# Patient Record
Sex: MALE | Race: WHITE | Employment: FULL TIME
[De-identification: names, ages, dates, MRNs, and addresses within clinical notes are randomized per-mention and may not be internally consistent; named-entity substitution may affect disease eponyms.]

---

## 2019-11-05 ENCOUNTER — HEALTH MAINTENANCE LETTER (OUTPATIENT)
Age: 34
End: 2019-11-05

## 2019-11-27 ENCOUNTER — OFFICE VISIT (OUTPATIENT)
Dept: SLEEP MEDICINE | Facility: CLINIC | Age: 34
End: 2019-11-27
Payer: COMMERCIAL

## 2019-11-27 VITALS
RESPIRATION RATE: 16 BRPM | SYSTOLIC BLOOD PRESSURE: 123 MMHG | OXYGEN SATURATION: 99 % | WEIGHT: 173 LBS | BODY MASS INDEX: 22.93 KG/M2 | HEART RATE: 73 BPM | DIASTOLIC BLOOD PRESSURE: 77 MMHG | HEIGHT: 73 IN

## 2019-11-27 DIAGNOSIS — R53.82 CHRONIC FATIGUE: ICD-10-CM

## 2019-11-27 DIAGNOSIS — G47.9 SLEEP DISTURBANCE: ICD-10-CM

## 2019-11-27 DIAGNOSIS — R06.81 WITNESSED APNEIC SPELLS: Primary | ICD-10-CM

## 2019-11-27 PROCEDURE — 99204 OFFICE O/P NEW MOD 45 MIN: CPT | Performed by: INTERNAL MEDICINE

## 2019-11-27 ASSESSMENT — MIFFLIN-ST. JEOR: SCORE: 1788.6

## 2019-11-27 NOTE — PATIENT INSTRUCTIONS
Your BMI is Body mass index is 22.82 kg/m .  Weight management is a personal decision.  If you are interested in exploring weight loss strategies, the following discussion covers the approaches that may be successful. Body mass index (BMI) is one way to tell whether you are at a healthy weight, overweight, or obese. It measures your weight in relation to your height.  A BMI of 18.5 to 24.9 is in the healthy range. A person with a BMI of 25 to 29.9 is considered overweight, and someone with a BMI of 30 or greater is considered obese. More than two-thirds of American adults are considered overweight or obese.  Being overweight or obese increases the risk for further weight gain. Excess weight may lead to heart disease and diabetes.  Creating and following plans for healthy eating and physical activity may help you improve your health.  Weight control is part of healthy lifestyle and includes exercise, emotional health, and healthy eating habits. Careful eating habits lifelong are the mainstay of weight control. Though there are significant health benefits from weight loss, long-term weight loss with diet alone may be very difficult to achieve- studies show long-term success with dietary management in less than 10% of people. Attaining a healthy weight may be especially difficult to achieve in those with severe obesity. In some cases, medications, devices and surgical management might be considered.  What can you do?  If you are overweight or obese and are interested in methods for weight loss, you should discuss this with your provider.     Consider reducing daily calorie intake by 500 calories.     Keep a food journal.     Avoiding skipping meals, consider cutting portions instead.    Diet combined with exercise helps maintain muscle while optimizing fat loss. Strength training is particularly important for building and maintaining muscle mass. Exercise helps reduce stress, increase energy, and improves fitness.  Increasing exercise without diet control, however, may not burn enough calories to loose weight.       Start walking three days a week 10-20 minutes at a time    Work towards walking thirty minutes five days a week     Eventually, increase the speed of your walking for 1-2 minutes at time    In addition, we recommend that you review healthy lifestyles and methods for weight loss available through the National Institutes of Health patient information sites:  http://win.niddk.nih.gov/publications/index.htm    And look into health and wellness programs that may be available through your health insurance provider, employer, local community center, or yosvany club.

## 2019-11-27 NOTE — PROGRESS NOTES
Sleep Consultation:    Date on this visit: 11/27/2019    Guanakito Salazar is sent by No ref. provider found for a sleep consultation regarding tiredness and possible sleep apnea.    Primary Physician: Jenae Karimi     Chief compliant: witnessed apneas, fatigue, headaches     Presenting History:     Guanakito Salazar reports frequent witnessed apneas and daytime fatigue for last 5 years.     Patient reports that his sleep is non refreshing and he feels tired through out the day. He denies inadvertent napping or significant daytime sleepiness.       Patient has woken up gasping and his wife has witnessed apneas in his sleep. Guanakito doesnot thinks that he snores . Patient does have a regular bed partner. There is report of gasping and poor quality of sleep.  He does have witnessed apneas. They never sleep separately.  Patient sleeps on his back, side and stomach. He has occasional morning headaches and frequent morning dry mouth, denies no restless legs. Guanakito denies any bruxism, sleep walking, sleep talking, dream enactment, sleep paralysis, cataplexy and hypnogogic/hypnopompic hallucinations.    Patient describes themself as a night person.  He would prefer to go to sleep at 1:00 AM and wake up at 10:00 AM.    Guanakito goes to sleep at 11:00 PM during the week. He wakes up at 7:00 AM with an alarm. He falls asleep in 5 minutes.  Guanakito denies difficulty falling asleep.  He wakes up 3 times a night for 10 minutes before falling back to sleep.  Guanakito wakes up to external stimuli.  On weekends, Guanakito goes to sleep at 11:00 PM.  He wakes up at 9:00 AM without an alarm. He falls asleep in 5 minutes.  Patient gets an average of 8 hours of sleep per night.     Guanakito denies difficulty breathing through his nose.      Patient's [place] Sleepiness score 9/24 consistent with no daytime sleepiness.      Guanakito naps 2 times per week for 30-60 minutes, feels refreshed after naps. He takes no inadvertant naps.  He denies closing eyes, dozing and falling asleep  while driving.   Patient was counseled on the importance of driving while alert, to pull over if drowsy, or nap before getting into the vehicle if sleepy.      He uses 2 cups/day of coffee. Last caffeine intake is usually before 2 pm.    Allergies:    No Known Allergies    Medications:    No current outpatient medications on file.       Problem List:  Patient Active Problem List    Diagnosis Date Noted     CARDIOVASCULAR SCREENING; LDL GOAL LESS THAN 160 10/31/2010     Priority: Medium     Chronic rhinitis 05/28/2010     Priority: Medium     Seasonal allergies      Priority: Medium        Past Medical/Surgical History:  Past Medical History:   Diagnosis Date     Seasonal allergies      Past Surgical History:   Procedure Laterality Date     NO HISTORY OF SURGERY         Social History:  Social History     Socioeconomic History     Marital status: Single     Spouse name: Not on file     Number of children: Not on file     Years of education: Not on file     Highest education level: Not on file   Occupational History     Occupation:      Employer: [employer]   Social Needs     Financial resource strain: Not on file     Food insecurity:     Worry: Not on file     Inability: Not on file     Transportation needs:     Medical: Not on file     Non-medical: Not on file   Tobacco Use     Smoking status: Never Smoker     Smokeless tobacco: Never Used   Substance and Sexual Activity     Alcohol use: Yes     Drug use: No     Sexual activity: Yes     Partners: Female   Lifestyle     Physical activity:     Days per week: Not on file     Minutes per session: Not on file     Stress: Not on file   Relationships     Social connections:     Talks on phone: Not on file     Gets together: Not on file     Attends Spiritism service: Not on file     Active member of club or organization: Not on file     Attends meetings of clubs or organizations: Not on file     Relationship status: Not on file     Intimate partner violence:     " Fear of current or ex partner: Not on file     Emotionally abused: Not on file     Physically abused: Not on file     Forced sexual activity: Not on file   Other Topics Concern     Parent/sibling w/ CABG, MI or angioplasty before 65F 55M? Not Asked   Social History Narrative     Not on file       Family History:  Family History   Problem Relation Age of Onset     Allergies Mother      Diabetes Father      - Father snores loudly.     Review of Systems:  A complete review of systems reviewed by me is negative with the exeption of what has been mentioned in the history of present illness.  CONSTITUTIONAL: NEGATIVE for weight gain/loss, fever, chills, sweats or night sweats, drug allergies.  EYES: NEGATIVE for changes in vision, blind spots, double vision.  ENT: NEGATIVE for ear pain, sore throat, sinus pain, post-nasal drip, runny nose, bloody nose  CARDIAC: NEGATIVE for fast heartbeats or fluttering in chest, chest pain or pressure, breathlessness when lying flat, swollen legs or swollen feet.  NEUROLOGIC: NEGATIVE headaches, weakness or numbness in the arms or legs.  DERMATOLOGIC: NEGATIVE for rashes, new moles or change in mole(s)  PULMONARY: NEGATIVE SOB at rest, SOB with activity, dry cough, productive cough, coughing up blood, wheezing or whistling when breathing.    GASTROINTESTINAL: NEGATIVE for nausea or vomitting, loose or watery stools, fat or grease in stools, constipation, abdominal pain, bowel movements black in color or blood noted.  GENITOURINARY: NEGATIVE for pain during urination, blood in urine, urinating more frequently than usual, irregular menstrual periods.  MUSCULOSKELETAL: NEGATIVE for muscle pain, bone or joint pain, swollen joints.  ENDOCRINE: NEGATIVE for increased thirst or urination, diabetes.  LYMPHATIC: NEGATIVE for swollen lymph nodes, lumps or bumps in the breasts or nipple discharge.    Physical Examination:  Vitals: /77   Pulse 73   Resp 16   Ht 1.854 m (6' 1\")   Wt 78.5 " kg (173 lb)   SpO2 99%   BMI 22.82 kg/m    BMI= Body mass index is 22.82 kg/m .    Neck Cir (cm): 38 cm    [place] Total Score 11/27/2019   Total score - [place] 9       LENORA Total Score: 8 (11/27/19 0837)    GENERAL APPEARANCE: healthy, alert and no distress  EYES: Eyes grossly normal to inspection, PERRL and conjunctivae and sclerae normal  HENT: nose and mouth without ulcers or lesions, oropharynx crowded, soft palate dependent and tongue base enlarged  NECK: no adenopathy, no asymmetry, masses, or scars and thyroid normal to palpation  RESP: lungs clear to auscultation - no rales, rhonchi or wheezes  CV: regular rates and rhythm, normal S1 S2, no S3 or S4 and no murmur, click or rub  ABDOMEN: soft, nontender, without hepatosplenomegaly or masses and bowel sounds normal  MS: extremities normal- no gross deformities noted  NEURO: Normal strength and tone, mentation intact and speech normal  PSYCH: mentation appears normal and affect normal/bright  Mallampati Class: IV.  Tonsillar Stage: 1  hidden by pillars.    Impression/Plan:    1. To rule out obstructive sleep apnea   2. Witnessed apneas  3. Excessive daytime fatigue     - Patient is a 34 years old male, BMI 22, neck circumference 38 cm, presents with complaints of non-restorative sleep, witnessed apneas and excessive daytime fatigue. He has a crowed oropharynx on examination. Symptoms raise concerns for sleep apnea but are not not typical and pre test probability for moderate or severe sleep apnea is low. An overnight polysomnogram is recommended to assess for sleep disordered breathing or movement abnormalities that can explain his complaints of non restorative sleep and daytime fatigue.     Plan:     1. Polytomogram for assessment of sleep disordered breathing or movement abnormalities     He will follow up with me in approximately two weeks after his sleep study has been competed to review the results and discuss plan of care.       Polysomnography  reviewed.  Obstructive sleep apnea reviewed.  Complications of untreated sleep apnea were reviewed.    Dr. Dalton Vaca     CC: No ref. provider found

## 2019-11-27 NOTE — NURSING NOTE
"Chief Complaint   Patient presents with     Sleep Problem     Discuss sleep apnea, tiredness, headaches       Initial /77   Pulse 73   Resp 16   Ht 1.854 m (6' 1\")   Wt 78.5 kg (173 lb)   SpO2 99%   BMI 22.82 kg/m   Estimated body mass index is 22.82 kg/m  as calculated from the following:    Height as of this encounter: 1.854 m (6' 1\").    Weight as of this encounter: 78.5 kg (173 lb).    Medication Reconciliation: complete     ESS 9  Neck 38cm  Fannie Fuentes MA        "

## 2019-12-22 ENCOUNTER — THERAPY VISIT (OUTPATIENT)
Dept: SLEEP MEDICINE | Facility: CLINIC | Age: 34
End: 2019-12-22
Payer: COMMERCIAL

## 2019-12-22 DIAGNOSIS — G47.9 SLEEP DISTURBANCE: ICD-10-CM

## 2019-12-22 DIAGNOSIS — R53.82 CHRONIC FATIGUE: ICD-10-CM

## 2019-12-22 DIAGNOSIS — R06.81 WITNESSED APNEIC SPELLS: ICD-10-CM

## 2019-12-22 PROCEDURE — 95810 POLYSOM 6/> YRS 4/> PARAM: CPT | Performed by: INTERNAL MEDICINE

## 2019-12-23 LAB — SLPCOMP: NORMAL

## 2019-12-23 NOTE — PROCEDURES
" SLEEP STUDY INTERPRETATION  DIAGNOSTIC POLYSOMNOGRAPHY REPORT      Patient: CEZAR NUNEZ  YOB: 1985  Study Date: 12/22/2019  MRN: [id number]  Referring Provider: None  Ordering Provider: MD Vaca Rakesh    Indications for Polysomnography: The patient is a 34 y old Male who is 6' 1\" and weighs 173.0 lbs. His BMI is 22.9, [place] sleepiness scale 9.0 and neck circumference is 38.0 cm. Relevant medical history includes no significant medical co-morbidities. A diagnostic polysomnogram was performed to evaluate for sleep apnea.    Polysomnogram Data: A full night polysomnogram recorded the standard physiologic parameters including EEG, EOG, EMG, ECG, nasal and oral airflow. Respiratory parameters of chest and abdominal movements were recorded with respiratory inductance plethysmography. Oxygen saturation was recorded by pulse oximetry. Hypopnea scoring rule used: 1B 4%.    Sleep Architecture: Fairly normal with mildly reduced sleep efficiency.   The total recording time of the polysomnogram was 504.4 minutes. The total sleep time was 410.5 minutes. Sleep latency was increased at 39.4 minutes without the use of a sleep aid. REM latency was 68.0 minutes. Arousal index was normal at 10.5 arousals per hour. Sleep efficiency was decreased at 81.4%. Wake after sleep onset was 44.5 minutes. The patient spent 6.8% of total sleep time in Stage N1, 46.2% in Stage N2, 23.8% in Stage N3, and 23.3% in REM. Time in REM supine was 92.0 minutes.    Respiration: Negative for sleep apnea.     Events ? The polysomnogram revealed a presence of - obstructive, 6 central, and - mixed apneas resulting in an apnea index of 0.9 events per hour. There were - obstructive hypopneas and - central hypopneas resulting in an obstructive hypopnea index of - and central hypopnea index of - events per hour. The combined apnea/hypopnea index was 0.9 events per hour (central apnea/hypopnea index was 0.9 events per hour). The REM AHI was 3.1 " events per hour. The supine AHI was 1.1 events per hour. The RERA index was - events per hour.  The RDI was 0.9 events per hour.    Snoring - was reported as absent.    Respiratory rate and pattern - was notable for normal respiratory rate and pattern.    Sustained Sleep Associated Hypoventilation - Transcutaneous carbon dioxide monitoring was not used, however significant hypoventilation was not suggested by oximetry.    Sleep Associated Hypoxemia - (Greater than 5 minutes O2 sat at or below 88%) was not present. Baseline oxygen saturation was 96.2%. Lowest oxygen saturation was 92.7%. Time spent less than or equal to 88% was 0 minutes. Time spent less than or equal to 89% was 0 minutes.    Movement Activity: There was no significant movement abnormality.     Periodic Limb Activity - There were 6 PLMs during the entire study. The PLM index was 0.9 movements per hour. The PLM Arousal Index was 0.3 per hour.    REM EMG Activity - Excessive transient/sustained muscle activity was not present.    Nocturnal Behavior - Abnormal sleep related behaviors were not noted during/arising out of NREM / REM sleep.     Bruxism - None apparent.    Cardiac Summary: Sinus rhythm.   The average pulse rate was 50.2 bpm. The minimum pulse rate was 41.9 bpm while the maximum pulse rate was 88.2 bpm.  Arrhythmias were not noted.    Assessment:     This sleep study is negative for clinically significant sleep apnea. This was a good test which captured supine and non supine sleep including supine REM without evidence for clinically significant sleep apnea.      Recommendations:    Patient can be reassured regarding absence of clinically significant sleep apnea or another sleep fragmenting condition.    Diagnostic Codes:   Unspecified Sleep Disturbance G47.9    12/22/2019 [facility] Study (173.0 lbs) - AHI 0.9, RDI 0.9, Supine AHI 1.1, REM AHI 3.1, Low O2 92.7%, Time Spent ?88% 0 minutes / Time Spent ?89% 0  minutes.    _____________________________________   Electronically Signed By: Dalton Vaca MD 12/23/2019

## 2019-12-23 NOTE — PATIENT INSTRUCTIONS
[place] [facility]    1. Your sleep study will be reviewed by a sleep physician within the next few days.     2. Please follow up in the sleep clinic as scheduled, or, make an appointment with your sleep provider to be seen within two weeks to discuss the results of the sleep study.    3. If you have any questions or problems with your treatment plan, please contact your sleep clinic provider at [phone] to further manage your condition.    4. Please review your attached medication list, and, at your follow-up appointment advise your sleep clinic provider about any changes.    5. Go to [email or web address] for more information about your sleep problems.    Clara Benavides, RPSGT  December 23, 2019

## 2020-01-29 ENCOUNTER — OFFICE VISIT (OUTPATIENT)
Dept: SLEEP MEDICINE | Facility: CLINIC | Age: 35
End: 2020-01-29
Payer: COMMERCIAL

## 2020-01-29 VITALS
WEIGHT: 174 LBS | BODY MASS INDEX: 23.06 KG/M2 | HEIGHT: 73 IN | RESPIRATION RATE: 16 BRPM | OXYGEN SATURATION: 98 % | SYSTOLIC BLOOD PRESSURE: 112 MMHG | DIASTOLIC BLOOD PRESSURE: 72 MMHG | HEART RATE: 71 BPM

## 2020-01-29 DIAGNOSIS — R53.83 FATIGUE, UNSPECIFIED TYPE: Primary | ICD-10-CM

## 2020-01-29 PROCEDURE — 99213 OFFICE O/P EST LOW 20 MIN: CPT | Performed by: INTERNAL MEDICINE

## 2020-01-29 ASSESSMENT — MIFFLIN-ST. JEOR: SCORE: 1793.01

## 2020-01-29 NOTE — PROGRESS NOTES
Sleep Study Follow-Up Visit:    Date on this visit: 1/29/2020    Guanakito Salazar comes in today for follow-up of his sleep study done on 12/22/2019 at the [facility] [place] for possible sleep apnea.    Sleep latency 39 minutes without Ambien.  REM achieved.   REM latency 68 minutes.  Sleep efficiency 81%. Total sleep time 410.5 minutes.    Sleep architecture:  Stage 1, 6.8% (5%), stage 2, 46% (45-55%), stage 3, 23.8% (15-20%), stage REM, 23.3% (20-25%).  AHI was 0.9, without desaturations. RDI 0.9.  REM RDI 3.1, consistent with no REM WANDA.  Supine RDI 1.1, consistent with no SUPINE WANDA.  Periodic Limb Movement Index 0.9/hour.       These findings were reviewed with patient.     Past medical/surgical history, family history, social history, medications and allergies were reviewed.      Problem List:  Patient Active Problem List    Diagnosis Date Noted     CARDIOVASCULAR SCREENING; LDL GOAL LESS THAN 160 10/31/2010     Priority: Medium     Chronic rhinitis 05/28/2010     Priority: Medium     Seasonal allergies      Priority: Medium        Impression/Plan:    1. Negative for sleep apnea or other sleep fragmenting conditions     Sleep study result was reviewed in detail. This was a good test that captured supine and non-supine sleep including supine REM. There was no evidence of clinically significant sleep apnea or other sleep disorders.     - Patient was reassured.       Fifteen minutes spent with patient, all of which were spent face-to-face counseling, consulting, coordinating plan of care.      Dr. Dalton Vaca     CC: Jenae Karimi

## 2020-01-29 NOTE — PATIENT INSTRUCTIONS
Your BMI is Body mass index is 22.96 kg/m .  Weight management is a personal decision.  If you are interested in exploring weight loss strategies, the following discussion covers the approaches that may be successful. Body mass index (BMI) is one way to tell whether you are at a healthy weight, overweight, or obese. It measures your weight in relation to your height.  A BMI of 18.5 to 24.9 is in the healthy range. A person with a BMI of 25 to 29.9 is considered overweight, and someone with a BMI of 30 or greater is considered obese. More than two-thirds of American adults are considered overweight or obese.  Being overweight or obese increases the risk for further weight gain. Excess weight may lead to heart disease and diabetes.  Creating and following plans for healthy eating and physical activity may help you improve your health.  Weight control is part of healthy lifestyle and includes exercise, emotional health, and healthy eating habits. Careful eating habits lifelong are the mainstay of weight control. Though there are significant health benefits from weight loss, long-term weight loss with diet alone may be very difficult to achieve- studies show long-term success with dietary management in less than 10% of people. Attaining a healthy weight may be especially difficult to achieve in those with severe obesity. In some cases, medications, devices and surgical management might be considered.  What can you do?  If you are overweight or obese and are interested in methods for weight loss, you should discuss this with your provider.     Consider reducing daily calorie intake by 500 calories.     Keep a food journal.     Avoiding skipping meals, consider cutting portions instead.    Diet combined with exercise helps maintain muscle while optimizing fat loss. Strength training is particularly important for building and maintaining muscle mass. Exercise helps reduce stress, increase energy, and improves fitness.  Increasing exercise without diet control, however, may not burn enough calories to loose weight.       Start walking three days a week 10-20 minutes at a time    Work towards walking thirty minutes five days a week     Eventually, increase the speed of your walking for 1-2 minutes at time    In addition, we recommend that you review healthy lifestyles and methods for weight loss available through the National Institutes of Health patient information sites:  http://win.niddk.nih.gov/publications/index.htm    And look into health and wellness programs that may be available through your health insurance provider, employer, local community center, or yosvany club.

## 2020-01-29 NOTE — NURSING NOTE
"Chief Complaint   Patient presents with     Sleep Study     Follow up psg results        Initial /72   Pulse 71   Resp 16   Ht 1.854 m (6' 0.99\")   Wt 78.9 kg (174 lb)   SpO2 98%   BMI 22.96 kg/m   Estimated body mass index is 22.96 kg/m  as calculated from the following:    Height as of this encounter: 1.854 m (6' 0.99\").    Weight as of this encounter: 78.9 kg (174 lb).    Medication Reconciliation: complete    ESS 7    Ashley Ho MA      "

## 2020-11-22 ENCOUNTER — HEALTH MAINTENANCE LETTER (OUTPATIENT)
Age: 35
End: 2020-11-22

## 2021-09-19 ENCOUNTER — HEALTH MAINTENANCE LETTER (OUTPATIENT)
Age: 36
End: 2021-09-19

## 2022-01-09 ENCOUNTER — HEALTH MAINTENANCE LETTER (OUTPATIENT)
Age: 37
End: 2022-01-09

## 2022-11-20 ENCOUNTER — HEALTH MAINTENANCE LETTER (OUTPATIENT)
Age: 37
End: 2022-11-20

## 2023-04-16 ENCOUNTER — HEALTH MAINTENANCE LETTER (OUTPATIENT)
Age: 38
End: 2023-04-16